# Patient Record
Sex: MALE | Race: WHITE | Employment: UNEMPLOYED | URBAN - METROPOLITAN AREA
[De-identification: names, ages, dates, MRNs, and addresses within clinical notes are randomized per-mention and may not be internally consistent; named-entity substitution may affect disease eponyms.]

---
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
## 2024-03-21 ENCOUNTER — OFFICE VISIT (OUTPATIENT)
Dept: PLASTIC SURGERY | Facility: CLINIC | Age: 19
End: 2024-03-21
Payer: MEDICAID

## 2024-03-21 DIAGNOSIS — J34.829 NASAL VALVE COLLAPSE: Primary | ICD-10-CM

## 2024-03-21 DIAGNOSIS — J34.89 NASAL OBSTRUCTION: ICD-10-CM

## 2024-03-21 NOTE — PROGRESS NOTES
Abdomen is referred in by Dr. Arias at [facility].  His primary complaint is nasal obstruction.  He has no history of nasal trauma and he has no known allergies.  He had a septoplasty turbinate reduction and nasal valve grafts placed in May 2023.  He does not feel he is at adequate improvement he has been using nasal cones which do provide some improvement he would like to breathe better through his nose he might consider having a nose reshape some but that is an issue to be discussed at another time after we laid out an appropriate treatment plan for his nasal obstruction.  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  We spent more than 20 minutes in consultation today exam shows a pleasant fit young male but Quijano 3 skin is occlusion is excellent.  His palatal arch is normal.  He does not snore.  There is no head neck adenopathy.  He has relatively good facial symmetry.  Focused exam of the nose shows it to be slightly long slightly over projected and has average thickness of skin is radix is reasonable there is a dorsal convexity.  The tip is just slightly dependent.  He has a mild septal deformity to the left IN the vault of the nose septum most posteriorly is nearly midline.  Batten grafts are palpable in the sidewalls.  There is area of right curvature of the lateral crura of the lower lateral cartilages supportive these areas improves his breathing quite considerably.  Assessment valve collapse and mild septal deformity with persistent nasal obstruction.  He has used nasal steroid sprays on a very intermittent basis.  Recommendation given the fact that he has used the steroids on an irregular basis and that most insurance companies require a trial of medical therapy before considering any surgical approval I would recommend that he try that for a month to 6 weeks.  I would be happy to visit with him again.  He was anxious to know if there were any synthetic  implants that could be placed in the sidewall to support the airway.  I told him I try and avoid those due to the risk of infection and I prefer to use biologic structural support.  I also discussed and the potential for banked rib cartilage.  He is aware of the risks and benefits of surgery the most common of which is as he is experienced the need for additional surgery.  He has batten grafts currently clicks some when he moves his nose and therefore I think they need have a better support laterally and extending beyond the bony base.  He is understanding of our approach and I will see him again in 6 weeks.  I will get a release of information so I can review his previous nasal surgery and understand the better the details.  Will drop a note to the referring doctor.BRYSON DELGADO MD

## 2024-03-21 NOTE — LETTER
3/21/2024       RE: Jesus Alberto Domingo  [address]  [facility] [address]     Dear Colleague,    Thank you for referring your patient, Jesus Alberto Domingo, to the [facility] at [facility]. Please see a copy of my visit note below.    Abdomen is referred in by Dr. Arias at [facility].  His primary complaint is nasal obstruction.  He has no history of nasal trauma and he has no known allergies.  He had a septoplasty turbinate reduction and nasal valve grafts placed in May 2023.  He does not feel he is at adequate improvement he has been using nasal cones which do provide some improvement he would like to breathe better through his nose he might consider having a nose reshape some but that is an issue to be discussed at another time after we laid out an appropriate treatment plan for his nasal obstruction.  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  We spent more than 20 minutes in consultation today exam shows a pleasant fit young male but Quijano 3 skin is occlusion is excellent.  His palatal arch is normal.  He does not snore.  There is no head neck adenopathy.  He has relatively good facial symmetry.  Focused exam of the nose shows it to be slightly long slightly over projected and has average thickness of skin is radix is reasonable there is a dorsal convexity.  The tip is just slightly dependent.  He has a mild septal deformity to the left IN the vault of the nose septum most posteriorly is nearly midline.  Batten grafts are palpable in the sidewalls.  There is area of right curvature of the lateral crura of the lower lateral cartilages supportive these areas improves his breathing quite considerably.  Assessment valve collapse and mild septal deformity with persistent nasal obstruction.  He has used nasal steroid sprays on a very intermittent basis.  Recommendation given the fact that he has used the  steroids on an irregular basis and that most insurance companies require a trial of medical therapy before considering any surgical approval I would recommend that he try that for a month to 6 weeks.  I would be happy to visit with him again.  He was anxious to know if there were any synthetic implants that could be placed in the sidewall to support the airway.  I told him I try and avoid those due to the risk of infection and I prefer to use biologic structural support.  I also discussed and the potential for banked rib cartilage.  He is aware of the risks and benefits of surgery the most common of which is as he is experienced the need for additional surgery.  He has batten grafts currently clicks some when he moves his nose and therefore I think they need have a better support laterally and extending beyond the bony base.  He is understanding of our approach and I will see him again in 6 weeks.  I will get a release of information so I can review his previous nasal surgery and understand the better the details.  Will drop a note to the referring doctor.      Again, thank you for allowing me to participate in the care of your patient.      Sincerely,    BRYSON DELGADO MD

## 2024-03-21 NOTE — LETTER
March 21, 2024  Re: Jesus Alberto Domingo  2005    Dear Dr. Borges,    Thank you so much for referring Jesus Alberto Domingo to the [facility]. I had the pleasure of visiting with Jesus Alberto today.     Attached you will find a copy of my note. Please feel free to reach out to me with any questions, [phone].     Abdomen is referred in by Dr. Arias at [facility].  His primary complaint is nasal obstruction.  He has no history of nasal trauma and he has no known allergies.  He had a septoplasty turbinate reduction and nasal valve grafts placed in May 2023.  He does not feel he is at adequate improvement he has been using nasal cones which do provide some improvement he would like to breathe better through his nose he might consider having a nose reshape some but that is an issue to be discussed at another time after we laid out an appropriate treatment plan for his nasal obstruction.  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  We spent more than 20 minutes in consultation today exam shows a pleasant fit young male but Quijano 3 skin is occlusion is excellent.  His palatal arch is normal.  He does not snore.  There is no head neck adenopathy.  He has relatively good facial symmetry.  Focused exam of the nose shows it to be slightly long slightly over projected and has average thickness of skin is radix is reasonable there is a dorsal convexity.  The tip is just slightly dependent.  He has a mild septal deformity to the left IN the vault of the nose septum most posteriorly is nearly midline.  Batten grafts are palpable in the sidewalls.  There is area of right curvature of the lateral crura of the lower lateral cartilages supportive these areas improves his breathing quite considerably.  Assessment valve collapse and mild septal deformity with persistent nasal obstruction.  He has used nasal steroid sprays on a very intermittent basis.  Recommendation given the fact that he has  used the steroids on an irregular basis and that most insurance companies require a trial of medical therapy before considering any surgical approval I would recommend that he try that for a month to 6 weeks.  I would be happy to visit with him again.  He was anxious to know if there were any synthetic implants that could be placed in the sidewall to support the airway.  I told him I try and avoid those due to the risk of infection and I prefer to use biologic structural support.  I also discussed and the potential for banked rib cartilage.  He is aware of the risks and benefits of surgery the most common of which is as he is experienced the need for additional surgery.  He has batten grafts currently clicks some when he moves his nose and therefore I think they need have a better support laterally and extending beyond the bony base.  He is understanding of our approach and I will see him again in 6 weeks.  I will get a release of information so I can review his previous nasal surgery and understand the better the details.  Will drop a note to the referring doctor.        Your trust in our practice and care is much appreciated.    Sincerely,    BRYSON DELGADO MD

## 2024-03-25 NOTE — PROGRESS NOTES
Updated photodocumentation obtained (see media tab).    Pt will follow up post Flonase trial.    Tata Andersen RN  3/25/2024 2:44 PM

## 2024-05-02 ENCOUNTER — OFFICE VISIT (OUTPATIENT)
Dept: PLASTIC SURGERY | Facility: CLINIC | Age: 19
End: 2024-05-02
Payer: COMMERCIAL

## 2024-05-02 DIAGNOSIS — J34.89 NASAL OBSTRUCTION: ICD-10-CM

## 2024-05-02 DIAGNOSIS — J34.89 NASAL OBSTRUCTION: Primary | ICD-10-CM

## 2024-05-02 DIAGNOSIS — J34.829 NASAL VALVE COLLAPSE: ICD-10-CM

## 2024-05-02 DIAGNOSIS — J34.829 NASAL VALVE COLLAPSE: Primary | ICD-10-CM

## 2024-05-02 NOTE — PROGRESS NOTES
Abdomen is back for further evaluation of his nasal obstruction and nasal deformity.  He has been faithful in the use of the nasal steroid sprays and has had no relief of his airway obstruction.  He would like to breathe better through his nose.  He also questions whether or not there could be changes made for aesthetic gain he would like the tip rotated and better supported and he has a small dorsal hump.  Exam shows the nose to be relatively straight septum has been nicely straightened with his previous surgery.  The turbinates are slightly enlarged.  The area of right curvature of the lateral crura of the lower lateral cartilages does impinge into the airway and support of these areas immediately improves his airway.  This confirms that he has vestibular stenosis.  The tip has become somewhat ptotic over time.  There is a small osseous dorsal hump.  I think that he needs improvement images vestibular stenosis.  To achieve this we would use a septal extension graft from perhaps a banked rib graft and auricular cartilage graft to provide new batten grafts as I do not think he has sufficient septum to be a donor site.  The risks and benefits particularly infection bleeding inadequate correction of the airway were discussed with him at length and a 10% revision rate was discussed.  If he wanted the dorsal hump removed that would add time to the surgery and that would be cosmetic in nature.  They want his vestibular stenosis corrected I would better support the tip and put in batten grafts to tension the lateral crura and enhance the airway.  I would not aggressively reduce the size of his nose given the fact that he has some of thicker skin and yellow-red he has valve obstruction.  Recommendation: Medical therapy for his nasal obstruction is failed.  He has persistent vestibular stenosis and support of the sidewalls with the nose with a Q-tip or a Breathe Right strip immediately improves his airway thus correction of  vestibular stenosis CPT code 15359 is the appropriate intervention.  Has not required new grafts these would need to come from the ear less auricular cartilage graft CPT code 75340 would be appropriate.  I would also reduce the size of the turbinates and outfracture than.  Medical therapy has failed a septoplasty has corrected the septal deformity and his persistent obstruction is secondary to valve collapse thus I think insurance coverage of that portion of his surgery is appropriate if he wanted to help reduce that would be an additional hour of surgical time.  Staff will work with him regarding proceeding forward.  If he has any questions she will be in contact with us.BRYSON DELGADO MD

## 2024-05-02 NOTE — LETTER
5/2/2024       RE: Jesus Alberto Domingo  [address]  [facility] [address]       Dear Colleague,    Thank you for referring your patient, Jesus Alberto Domingo, to the [facility] at [facility]. Please see a copy of my visit note below.    Abdomen is back for further evaluation of his nasal obstruction and nasal deformity.  He has been faithful in the use of the nasal steroid sprays and has had no relief of his airway obstruction.  He would like to breathe better through his nose.  He also questions whether or not there could be changes made for aesthetic gain he would like the tip rotated and better supported and he has a small dorsal hump.  Exam shows the nose to be relatively straight septum has been nicely straightened with his previous surgery.  The turbinates are slightly enlarged.  The area of right curvature of the lateral crura of the lower lateral cartilages does impinge into the airway and support of these areas immediately improves his airway.  This confirms that he has vestibular stenosis.  The tip has become somewhat ptotic over time.  There is a small osseous dorsal hump.  I think that he needs improvement images vestibular stenosis.  To achieve this we would use a septal extension graft from perhaps a banked rib graft and auricular cartilage graft to provide new batten grafts as I do not think he has sufficient septum to be a donor site.  The risks and benefits particularly infection bleeding inadequate correction of the airway were discussed with him at length and a 10% revision rate was discussed.  If he wanted the dorsal hump removed that would add time to the surgery and that would be cosmetic in nature.  They want his vestibular stenosis corrected I would better support the tip and put in batten grafts to tension the lateral crura and enhance the airway.  I would not aggressively reduce the size of his nose given the fact that he has  some of thicker skin and yellow-red he has valve obstruction.  Recommendation: Medical therapy for his nasal obstruction is failed.  He has persistent vestibular stenosis and support of the sidewalls with the nose with a Q-tip or a Breathe Right strip immediately improves his airway thus correction of vestibular stenosis CPT code 72631 is the appropriate intervention.  Has not required new grafts these would need to come from the ear less auricular cartilage graft CPT code 06495 would be appropriate.  I would also reduce the size of the turbinates and outfracture than.  Medical therapy has failed a septoplasty has corrected the septal deformity and his persistent obstruction is secondary to valve collapse thus I think insurance coverage of that portion of his surgery is appropriate if he wanted to help reduce that would be an additional hour of surgical time.  Staff will work with him regarding proceeding forward.  If he has any questions she will be in contact with us.      Again, thank you for allowing me to participate in the care of your patient.      Sincerely,    BRYSON DELGADO MD

## 2024-05-02 NOTE — LETTER
May 2, 2024  Re: Jesus Alberto Domingo  2005    Dear Dr. Vo,    Thank you so much for referring Jesus Alberto Domingo to the [facility]. I had the pleasure of visiting with Jesus Alberto today.     Attached you will find a copy of my note. Please feel free to reach out to me with any questions, [phone].     Abdomen is back for further evaluation of his nasal obstruction and nasal deformity.  He has been faithful in the use of the nasal steroid sprays and has had no relief of his airway obstruction.  He would like to breathe better through his nose.  He also questions whether or not there could be changes made for aesthetic gain he would like the tip rotated and better supported and he has a small dorsal hump.  Exam shows the nose to be relatively straight septum has been nicely straightened with his previous surgery.  The turbinates are slightly enlarged.  The area of right curvature of the lateral crura of the lower lateral cartilages does impinge into the airway and support of these areas immediately improves his airway.  This confirms that he has vestibular stenosis.  The tip has become somewhat ptotic over time.  There is a small osseous dorsal hump.  I think that he needs improvement images vestibular stenosis.  To achieve this we would use a septal extension graft from perhaps a banked rib graft and auricular cartilage graft to provide new batten grafts as I do not think he has sufficient septum to be a donor site.  The risks and benefits particularly infection bleeding inadequate correction of the airway were discussed with him at length and a 10% revision rate was discussed.  If he wanted the dorsal hump removed that would add time to the surgery and that would be cosmetic in nature.  They want his vestibular stenosis corrected I would better support the tip and put in batten grafts to tension the lateral crura and enhance the airway.  I would not aggressively reduce the size of his nose given the fact that he  has some of thicker skin and yellow-red he has valve obstruction.  Recommendation: Medical therapy for his nasal obstruction is failed.  He has persistent vestibular stenosis and support of the sidewalls with the nose with a Q-tip or a Breathe Right strip immediately improves his airway thus correction of vestibular stenosis CPT code 86677 is the appropriate intervention.  Has not required new grafts these would need to come from the ear less auricular cartilage graft CPT code 93638 would be appropriate.  I would also reduce the size of the turbinates and outfracture than.  Medical therapy has failed a septoplasty has corrected the septal deformity and his persistent obstruction is secondary to valve collapse thus I think insurance coverage of that portion of his surgery is appropriate if he wanted to help reduce that would be an additional hour of surgical time.  Staff will work with him regarding proceeding forward.  If he has any questions she will be in contact with us.        Your trust in our practice and care is much appreciated.    Sincerely,    BRYSON DELGADO MD

## 2024-05-07 ENCOUNTER — TELEPHONE (OUTPATIENT)
Dept: PLASTIC SURGERY | Facility: CLINIC | Age: 19
End: 2024-05-07

## 2024-05-07 NOTE — TELEPHONE ENCOUNTER
Called patient to inform him that no PA is required for his procedure.     Left voicemail.     Princess Combs  Surgical Coordinator    5/7/2024

## 2024-08-22 ENCOUNTER — TRANSFERRED RECORDS (OUTPATIENT)
Dept: HEALTH INFORMATION MANAGEMENT | Facility: CLINIC | Age: 19
End: 2024-08-22

## 2024-08-22 DIAGNOSIS — J34.2 NASAL SEPTAL DEVIATION: ICD-10-CM

## 2024-08-22 DIAGNOSIS — J34.89 NASAL OBSTRUCTION: Primary | ICD-10-CM

## 2024-08-22 DIAGNOSIS — G89.18 ACUTE POST-OPERATIVE PAIN: ICD-10-CM

## 2024-08-22 RX ORDER — ONDANSETRON 4 MG/1
4 TABLET, ORALLY DISINTEGRATING ORAL EVERY 8 HOURS PRN
Qty: 10 TABLET | Refills: 0 | Status: SHIPPED | OUTPATIENT
Start: 2024-08-22

## 2024-08-22 RX ORDER — DOCUSATE SODIUM 100 MG/1
100 CAPSULE, LIQUID FILLED ORAL 2 TIMES DAILY
Qty: 10 CAPSULE | Refills: 0 | Status: SHIPPED | OUTPATIENT
Start: 2024-08-22

## 2024-08-22 RX ORDER — OXYCODONE HYDROCHLORIDE 5 MG/1
5 TABLET ORAL EVERY 6 HOURS PRN
Qty: 12 TABLET | Refills: 0 | Status: SHIPPED | OUTPATIENT
Start: 2024-08-22 | End: 2024-08-25

## 2024-08-29 ENCOUNTER — OFFICE VISIT (OUTPATIENT)
Dept: PLASTIC SURGERY | Facility: CLINIC | Age: 19
End: 2024-08-29
Payer: COMMERCIAL

## 2024-08-29 DIAGNOSIS — Z98.890 POSTOPERATIVE STATE: Primary | ICD-10-CM

## 2024-08-29 NOTE — LETTER
8/29/2024       RE: Jesus Alberto Domingo  [address]  [place] [address]     Dear Colleague,    Thank you for referring your patient, Jesus Alberto Domingo, to the  [facility] at [facility]. Please see a copy of my visit note below.    Juan is back following his surgery last week and he has had no great difficulties during that time.  As expected his ear creating more discomfort than his nose but he was able to breathe through his nose better than with previous surgeries even with the splints and.  Exam shows no evidence of infection of the ears cleansed and healing is very appropriate there is no hematoma and the contours are good.  The nose is examined following removal of the stents and he has a terrific airway dimension.  The knots across the columella are trimmed.  We talked about continuing saline irrigation and cleansing of the alar margins.  He will see us in 6 weeks and will get photos at that time.  His mom is with him today and suggested that his brother will be coming to see us as well.BRYSON DELGADO MD       Again, thank you for allowing me to participate in the care of your patient.      Sincerely,    BRYSON DELGADO MD

## 2024-08-29 NOTE — LETTER
August 29, 2024  Re: Jesus Alberto Domingo  2005    Dear Dr. Vo,    Thank you so much for referring Jesus Alberto Domingo to the [facility]. I had the pleasure of visiting with Jesus Alberto today.     Attached you will find a copy of my note. Please feel free to reach out to me with any questions, [phone].     Juan is back following his surgery last week and he has had no great difficulties during that time.  As expected his ear creating more discomfort than his nose but he was able to breathe through his nose better than with previous surgeries even with the splints and.  Exam shows no evidence of infection of the ears cleansed and healing is very appropriate there is no hematoma and the contours are good.  The nose is examined following removal of the stents and he has a terrific airway dimension.  The knots across the columella are trimmed.  We talked about continuing saline irrigation and cleansing of the alar margins.  He will see us in 6 weeks and will get photos at that time.  His mom is with him today and suggested that his brother will be coming to see us as well.BRYSON DELGADO MD         Your trust in our practice and care is much appreciated.    Sincerely,  BRYSON DELGADO MD

## 2024-10-17 ENCOUNTER — OFFICE VISIT (OUTPATIENT)
Dept: PLASTIC SURGERY | Facility: CLINIC | Age: 19
End: 2024-10-17
Payer: COMMERCIAL

## 2024-10-17 DIAGNOSIS — Z98.890 POSTOPERATIVE STATE: Primary | ICD-10-CM

## 2024-10-17 NOTE — LETTER
10/17/2024       RE: Jesus Alberto Domingo  [address]  [facility] [address]     Dear Colleague,    Thank you for referring your patient, Jesus Alberto Domingo, to the [facility] at [facility]. Please see a copy of my visit note below.    Jesus Alberto is in to see us for ongoing evaluation of his postoperative state.  He breathes better but there is some restriction on the left.  The right airway and sidewall of terrific the septum is straight.  On the left the area at the upper lateral cartilage is somewhat thickened and it is not the cartilage of the thickened but the soft tissues to examine the intranasal side.  I injected 0.12 of 40 mg/cc Kenalog and 10 mg/cc Kenalog he will see us in July.[facility] [id number].BRYSON DELGADO MD       Again, thank you for allowing me to participate in the care of your patient.      Sincerely,    BRYSON DELGADO MD

## 2024-10-17 NOTE — LETTER
October 17, 2024  Re: Jesus Alberto Domingo  2005    Dear Dr. Vo,    Thank you so much for referring Jesus Alberto Domingo to the [facility]. I had the pleasure of visiting with Jesus Alberto today.     Attached you will find a copy of my note. Please feel free to reach out to me with any questions, [phone].     Jesus Alberto is in to see us for ongoing evaluation of his postoperative state.  He breathes better but there is some restriction on the left.  The right airway and sidewall of terrific the septum is straight.  On the left the area at the upper lateral cartilage is somewhat thickened and it is not the cartilage of the thickened but the soft tissues to examine the intranasal side.  I injected 0.12 of 40 mg/cc Kenalog and 10 mg/cc Kenalog he will see us in July.[facility] [id number].BRYSON DELGADO MD         Your trust in our practice and care is much appreciated.    Sincerely,  BRYSON DELGADO MD

## 2024-10-17 NOTE — NURSING NOTE
Photo documentation obtained at today's visit.  (See Media Tab).    Adriana Hurtado RN  10/17/2024 3:15 PM      Spontaneous, unlabored and symmetrical

## 2024-10-17 NOTE — PROGRESS NOTES
Jesus Alberto is in to see us for ongoing evaluation of his postoperative state.  He breathes better but there is some restriction on the left.  The right airway and sidewall of terrific the septum is straight.  On the left the area at the upper lateral cartilage is somewhat thickened and it is not the cartilage of the thickened but the soft tissues to examine the intranasal side.  I injected 0.12 of 40 mg/cc Kenalog and 10 mg/cc Kenalog he will see us in July.[facility] [id number].BRYSON DELGADO MD

## 2024-12-19 ENCOUNTER — OFFICE VISIT (OUTPATIENT)
Dept: PLASTIC SURGERY | Facility: CLINIC | Age: 19
End: 2024-12-19
Payer: MEDICAID

## 2024-12-19 DIAGNOSIS — Z98.890 POSTOPERATIVE STATE: Primary | ICD-10-CM

## 2024-12-19 NOTE — LETTER
12/19/2024       RE: Jesus Alberto Domingo  [address]  [facility] [address]     Dear Colleague,    Thank you for referring your patient, Jesus Alberto Domingo, to the [facility] CENTER at [facility]. Please see a copy of my visit note below.    Jesus Alberto is in to see us following his surgery and unfortunately the breathing is no better on the left side.  The batten graft is palpable externally but the sidewall is thick in the region of the batten graft placement.  Support of this area immediately improves her breathing.  I injected 0.1 of 10 mg/cc Kenalog mixed with a small amount of 5 FU today and he will see Dr. Gamboa in 6 weeks.  He may benefit from some 40 mg/cc Kenalog but we did not have any today.  I talked him about the potential down the road of thinning the scar on the undersurface of the batten graft and using a stent afterwards.  I gave him some stents to try today.  He feels that there is still some variable additional obstruction that he experiences.  His septum posteriorly looks excellent and his turbinates at this point today are not large.  I am going to have him experiment with some Afrin on an occasional basis to see if that alleviates some of the fluctuating component of his breathing challenges he understands the concept and not to use it on a long-term basis.  Will have him see Dr. Gamboa in 6 weeks.BRYSON DELGADO MD       Again, thank you for allowing me to participate in the care of your patient.      Sincerely,    BRYSON DELGADO MD

## 2024-12-19 NOTE — PROGRESS NOTES
Jesus Alberto is in to see us following his surgery and unfortunately the breathing is no better on the left side.  The batten graft is palpable externally but the sidewall is thick in the region of the batten graft placement.  Support of this area immediately improves her breathing.  I injected 0.1 of 10 mg/cc Kenalog mixed with a small amount of 5 FU today and he will see Dr. Gamboa in 6 weeks.  He may benefit from some 40 mg/cc Kenalog but we did not have any today.  I talked him about the potential down the road of thinning the scar on the undersurface of the batten graft and using a stent afterwards.  I gave him some stents to try today.  He feels that there is still some variable additional obstruction that he experiences.  His septum posteriorly looks excellent and his turbinates at this point today are not large.  I am going to have him experiment with some Afrin on an occasional basis to see if that alleviates some of the fluctuating component of his breathing challenges he understands the concept and not to use it on a long-term basis.  Will have him see Dr. Gamboa in 6 weeks.BRYSON DELGADO MD

## 2025-01-22 ENCOUNTER — OFFICE VISIT (OUTPATIENT)
Dept: PLASTIC SURGERY | Facility: CLINIC | Age: 20
End: 2025-01-22
Payer: COMMERCIAL

## 2025-01-22 DIAGNOSIS — Z98.890 POSTOPERATIVE STATE: Primary | ICD-10-CM

## 2025-01-22 NOTE — LETTER
April 13, 2025  Re: Jesus Alberto Domingo  2005    Dear Dr. Vo,    Thank you so much for referring Jesus Alberto Domingo to the [facility]. I had the pleasure of visiting with Jesus Alberto today.     Attached you will find a copy of my note. Please feel free to reach out to me with any questions, [phone].     Jesus Alberto is in to see us following his septorhinoplasty on 9/2/24. At last visit he noted continued restriction in breathing on the left side. He underwent injection of 0.1 of 10 mg/cc Kenalog mixed with a small amount of 5 Follow-up. He has been wearing stents at night since that visit.     He reports today that his breathing has markedly improved since last visit. He feels that he is able to breathe very well from both sides of his nose without issue. Additionally, he notes that the rhinorrhea and nasal obstruction at night has significantly improved. He has no further functional or aesthetic concerns today.    On exam, his dorsum is straight, the tip is well projected, the septum is midline. His lateral nasal sidewall is very well supported bilaterally. The thickness of his lateral nasal sidewall is improved since last visit.    A/P 6 months s/p septorhinoplasty. He is doing very well without functional or aesthetic concerns. I recommended he come back to see us in 3 months.     Jonel Gamboa MD  Fellow, Facial Plastic and Reconstructive Surgery        Your trust in our practice and care is much appreciated.    Sincerely,  BRYSON DELGADO MD

## 2025-01-22 NOTE — LETTER
1/22/2025       RE: Jesus Alberto Domingo  [address]  [facility] [address]     Dear Colleague,    Thank you for referring your patient, Jesus Alberto Domingo, to the  [facility] at [facility]. Please see a copy of my visit note below.    Jesus Alberto is in to see us following his septorhinoplasty on 9/2/24. At last visit he noted continued restriction in breathing on the left side. He underwent injection of 0.1 of 10 mg/cc Kenalog mixed with a small amount of 5 Follow-up. He has been wearing stents at night since that visit.     He reports today that his breathing has markedly improved since last visit. He feels that he is able to breathe very well from both sides of his nose without issue. Additionally, he notes that the rhinorrhea and nasal obstruction at night has significantly improved. He has no further functional or aesthetic concerns today.    On exam, his dorsum is straight, the tip is well projected, the septum is midline. His lateral nasal sidewall is very well supported bilaterally. The thickness of his lateral nasal sidewall is improved since last visit.    A/P 6 months s/p septorhinoplasty. He is doing very well without functional or aesthetic concerns. I recommended he come back to see us in 3 months.     Jonel Gamboa MD  Fellow, Facial Plastic and Reconstructive Surgery      Again, thank you for allowing me to participate in the care of your patient.      Sincerely,    BRYSON DELGADO MD

## 2025-04-13 NOTE — PROGRESS NOTES
Jesus Alberto is in to see us following his septorhinoplasty on 9/2/24. At last visit he noted continued restriction in breathing on the left side. He underwent injection of 0.1 of 10 mg/cc Kenalog mixed with a small amount of 5 Follow-up. He has been wearing stents at night since that visit.     He reports today that his breathing has markedly improved since last visit. He feels that he is able to breathe very well from both sides of his nose without issue. Additionally, he notes that the rhinorrhea and nasal obstruction at night has significantly improved. He has no further functional or aesthetic concerns today.    On exam, his dorsum is straight, the tip is well projected, the septum is midline. His lateral nasal sidewall is very well supported bilaterally. The thickness of his lateral nasal sidewall is improved since last visit.    A/P 6 months s/p septorhinoplasty. He is doing very well without functional or aesthetic concerns. I recommended he come back to see us in 3 months.     Jonel Gamboa MD  Fellow, Facial Plastic and Reconstructive Surgery

## 2025-04-30 ENCOUNTER — OFFICE VISIT (OUTPATIENT)
Dept: PLASTIC SURGERY | Facility: CLINIC | Age: 20
End: 2025-04-30

## 2025-04-30 DIAGNOSIS — Z98.890 POSTOPERATIVE STATE: Primary | ICD-10-CM

## 2025-04-30 NOTE — LETTER
4/30/2025       RE: Jesus Alberto Domingo  [address]  [facility] [address]     Dear Colleague,    Thank you for referring your patient, Jesus Alberto Domingo, to the [facility] CENTER at [facility]. Please see a copy of my visit note below.    Jesus Alberto's brother had an appointment with me today and Jesus Alberto came with him.  Jesus Alberto still has some congestion on the left side of his nose.  This does not reconcile well with the record from his visit here in January.  In the fall of last year I injected some Kenalog and 5-FU and the note in January said his breathing was better.  He now notes that his breathing is still congested on the left side.    Exam:  Exam shows the external form to be straight profile is improved but tip is still somewhat full but this is not surprising given the time since his surgery.  The septum is straight.  The left ala just below the upper lateral cartilages thickened.  Assessment persistent obstruction recommendation I injected 0.1 cc of 40 mg/cc Kenalog and 5-FU.  I would like to see him again in about a month.  He has an appointment to see me before that in he will call and adjust that appointment time as that will be too soon.BRYSON DELGADO MD       Again, thank you for allowing me to participate in the care of your patient.      Sincerely,    BRYSON DELGADO MD

## 2025-04-30 NOTE — PROGRESS NOTES
Jesus Alberto's brother had an appointment with me today and Jesus Alberto came with him.  Jesus Alberto still has some congestion on the left side of his nose.  This does not reconcile well with the record from his visit here in January.  In the fall of last year I injected some Kenalog and 5-FU and the note in January said his breathing was better.  He now notes that his breathing is still congested on the left side.    Exam:  Exam shows the external form to be straight profile is improved but tip is still somewhat full but this is not surprising given the time since his surgery.  The septum is straight.  The left ala just below the upper lateral cartilages thickened.  Assessment persistent obstruction recommendation I injected 0.1 cc of 40 mg/cc Kenalog and 5-FU.  I would like to see him again in about a month.  He has an appointment to see me before that in he will call and adjust that appointment time as that will be too soon.BRYSON DELGADO MD

## 2025-04-30 NOTE — LETTER
April 30, 2025  Re: Jesus Alberto Domingo  2005    Dear Dr. Cortés ref. provider found,    Thank you so much for referring Jesus Alberto Domingo to the [facility]. I had the pleasure of visiting with Jesus Alberto today.     Attached you will find a copy of my note. Please feel free to reach out to me with any questions, [phone].     Jesus Alberto's brother had an appointment with me today and Jesus Alberto came with him.  Jesus Alberto still has some congestion on the left side of his nose.  This does not reconcile well with the record from his visit here in January.  In the fall of last year I injected some Kenalog and 5-FU and the note in January said his breathing was better.  He now notes that his breathing is still congested on the left side.    Exam:  Exam shows the external form to be straight profile is improved but tip is still somewhat full but this is not surprising given the time since his surgery.  The septum is straight.  The left ala just below the upper lateral cartilages thickened.  Assessment persistent obstruction recommendation I injected 0.1 cc of 40 mg/cc Kenalog and 5-FU.  I would like to see him again in about a month.  He has an appointment to see me before that in he will call and adjust that appointment time as that will be too soon.BRYSON DELGADO MD         Your trust in our practice and care is much appreciated.    Sincerely,  BRYSON DELGADO MD

## 2025-05-28 ENCOUNTER — OFFICE VISIT (OUTPATIENT)
Dept: PLASTIC SURGERY | Facility: CLINIC | Age: 20
End: 2025-05-28
Payer: COMMERCIAL

## 2025-05-28 DIAGNOSIS — L91.0 HYPERTROPHIC SCAR: Primary | ICD-10-CM

## 2025-05-28 NOTE — PROGRESS NOTES
Jesus Alberto is back following his nasal surgery.  The Kenalog and 5-FU injection that I placed in the left ala last time did not meaningfully improve his airway.  Eye exam shows that I think think there is been some reduction in the bulk but still the caudal margin of the upper lateral cartilage is too close to the septum and elevation immediately improves the airway.  His septum is straight.  I would try once more with injection.  With that in mind I placed 0.1 cc of 40 mg/cc Kenalog and some 5-FU through the nasal vestibule into the scar.  I will have him see me in the fall.  And the CT 2686-1393-04 would be appropriate codes for this intervention.  If this persists I might do a transnasal resection with some plication sutures but will make that judgment in the fall.BRYSON DELGADO MD

## 2025-05-28 NOTE — LETTER
May 28, 2025  Re: Jesus Alberto Domingo  2005    Dear Dr. Borges,    Thank you so much for referring Jesus Alberto Domingo to the [facility]. I had the pleasure of visiting with Jesus Alberto today.     Attached you will find a copy of my note. Please feel free to reach out to me with any questions, [phone].     Jesus Alberto is back following his nasal surgery.  The Kenalog and 5-FU injection that I placed in the left ala last time did not meaningfully improve his airway.  Eye exam shows that I think think there is been some reduction in the bulk but still the caudal margin of the upper lateral cartilage is too close to the septum and elevation immediately improves the airway.  His septum is straight.  I would try once more with injection.  With that in mind I placed 0.1 cc of 40 mg/cc Kenalog and some 5-FU through the nasal vestibule into the scar.  I will have him see me in the fall.  And the CT 9689-1528-78 would be appropriate codes for this intervention.  If this persists I might do a transnasal resection with some plication sutures but will make that judgment in the fall.BRYSON DELGADO MD       Your trust in our practice and care is much appreciated.    Sincerely,  BRYSON DELGADO MD

## 2025-05-28 NOTE — LETTER
5/28/2025       RE: Jesus Alberto Domingo  [address]  [facility] [address]     Dear Colleague,    Thank you for referring your patient, Jesus Alberto Domingo, to the  [facility] at [facility]. Please see a copy of my visit note below.    Jesus Alberto is back following his nasal surgery.  The Kenalog and 5-FU injection that I placed in the left ala last time did not meaningfully improve his airway.  Eye exam shows that I think think there is been some reduction in the bulk but still the caudal margin of the upper lateral cartilage is too close to the septum and elevation immediately improves the airway.  His septum is straight.  I would try once more with injection.  With that in mind I placed 0.1 cc of 40 mg/cc Kenalog and some 5-FU through the nasal vestibule into the scar.  I will have him see me in the fall.  And the CT 4365-8839-81 would be appropriate codes for this intervention.  If this persists I might do a transnasal resection with some plication sutures but will make that judgment in the fall.BRYSON DELGADO MD     Again, thank you for allowing me to participate in the care of your patient.      Sincerely,    BRYSON DELGADO MD

## 2025-05-28 NOTE — NURSING NOTE
-Photo documentation obtained at today's visit.  (See Media Tab).    Adriana Hurtado RN  5/28/2025 3:26 PM

## 2025-06-19 ENCOUNTER — TELEPHONE (OUTPATIENT)
Dept: PLASTIC SURGERY | Facility: CLINIC | Age: 20
End: 2025-06-19

## 2025-06-19 NOTE — TELEPHONE ENCOUNTER
"-Spoke to the patient over the phone.    -The patient reported that \"his nasal breathing has been really bad lately\"    -He notes that \"the left side is worse, especially at night.\"    -The following recommendations were made in hopes to help his nasal breathing:    -start using nasal saline spray multiple times a day  -start using a pea sized amount of Aquaphor in both nostrils to keep the nose moist as well  -patient to try a humidifier in his room at night  -patient to try an OTC antihistamine (Zyrtec or Claritin to see if symptoms are allergic in nature)  -patient to use his Flonase consistently.  (Patient notes he hasn't used this in about 3 weeks).    -The patient was going to try these tips listed above for a couple of weeks.    -If no improvement, he is welcome to call our office and schedule a second opinion with Dr. Schwab.  Otherwise, we will see him as planned in October.    -Dr. Medina approved of this plan as well.    Adriana Hurtado RN  6/19/2025 3:19 PM     "

## 2025-06-19 NOTE — TELEPHONE ENCOUNTER
-A call back was placed to the patient, but there was no answer.    -Notified the patient that we were just returning his call as he noted to our  that he had a few nurse questions.    -Our clinic call back number was provided.    -Will await a call back.    Adriana Hurtado RN  6/19/2025 2:59 PM

## 2025-07-30 ENCOUNTER — OFFICE VISIT (OUTPATIENT)
Dept: PLASTIC SURGERY | Facility: CLINIC | Age: 20
End: 2025-07-30
Payer: COMMERCIAL

## 2025-07-30 DIAGNOSIS — J34.89 NASAL OBSTRUCTION: Primary | ICD-10-CM

## 2025-07-30 DIAGNOSIS — J34.829 NASAL VALVE COLLAPSE: ICD-10-CM

## 2025-08-27 ENCOUNTER — OFFICE VISIT (OUTPATIENT)
Dept: PLASTIC SURGERY | Facility: CLINIC | Age: 20
End: 2025-08-27
Payer: COMMERCIAL

## 2025-08-27 DIAGNOSIS — Z98.890 POSTOPERATIVE STATE: ICD-10-CM

## 2025-08-27 DIAGNOSIS — L91.0 HYPERTROPHIC SCAR: ICD-10-CM

## 2025-08-27 DIAGNOSIS — J34.89 NASAL OBSTRUCTION: Primary | ICD-10-CM

## 2025-08-27 DIAGNOSIS — J34.829 NASAL VALVE COLLAPSE: ICD-10-CM
